# Patient Record
Sex: MALE | Race: WHITE | NOT HISPANIC OR LATINO | Employment: FULL TIME | URBAN - METROPOLITAN AREA
[De-identification: names, ages, dates, MRNs, and addresses within clinical notes are randomized per-mention and may not be internally consistent; named-entity substitution may affect disease eponyms.]

---

## 2023-11-22 ENCOUNTER — OFFICE VISIT (OUTPATIENT)
Dept: CARDIOLOGY | Facility: CLINIC | Age: 68
End: 2023-11-22
Payer: COMMERCIAL

## 2023-11-22 VITALS
DIASTOLIC BLOOD PRESSURE: 76 MMHG | WEIGHT: 236.4 LBS | RESPIRATION RATE: 16 BRPM | BODY MASS INDEX: 33.1 KG/M2 | HEART RATE: 56 BPM | SYSTOLIC BLOOD PRESSURE: 122 MMHG | HEIGHT: 71 IN

## 2023-11-22 DIAGNOSIS — R93.1 ELEVATED CORONARY ARTERY CALCIUM SCORE: Primary | ICD-10-CM

## 2023-11-22 DIAGNOSIS — E78.5 HYPERLIPIDEMIA LDL GOAL <100: ICD-10-CM

## 2023-11-22 DIAGNOSIS — I25.10 ATHEROSCLEROSIS OF NATIVE CORONARY ARTERY OF NATIVE HEART WITHOUT ANGINA PECTORIS: ICD-10-CM

## 2023-11-22 PROCEDURE — 99204 OFFICE O/P NEW MOD 45 MIN: CPT | Performed by: INTERNAL MEDICINE

## 2023-11-22 RX ORDER — ROSUVASTATIN CALCIUM 20 MG/1
20 TABLET, COATED ORAL DAILY
COMMUNITY

## 2023-11-22 RX ORDER — OMEGA-3 FATTY ACIDS/FISH OIL 300-500 MG
1 CAPSULE ORAL DAILY
COMMUNITY

## 2023-11-22 RX ORDER — MULTIVITAMIN,THERAPEUTIC
1 TABLET ORAL DAILY
COMMUNITY

## 2023-11-22 RX ORDER — ASPIRIN 81 MG/1
81 TABLET ORAL DAILY
Qty: 90 TABLET | Refills: 3 | Status: SHIPPED | OUTPATIENT
Start: 2023-11-22

## 2023-11-22 NOTE — PROGRESS NOTES
[facility]  [phone]          Assessment/Recommendations   Patient with family history of premature coronary artery disease with his father having myocardial infarction at age 35 and dying at age 52.  Patient also has hyperlipidemia and a recent calcium score which was 122, all of which was in the left anterior descending coronary artery.  He does not have any anginal symptoms and minimal shortness of breath with vigorous activity.  All of the calcium is in the left anterior descending and I think it would be prudent to have a baseline stress test.  The anterior wall is very easily visualized on echocardiography so I would recommend a stress echocardiogram for him.  Patient is agreeable.    Agree with aggressive approach to lipids and he has been recently started on rosuvastatin.  Would recommend that he repeat a lipid panel with his primary care physician 3 months after starting this medication with a goal of LDL less than 100.    We talked about exercise and I have strongly encouraged him to have aerobic exercise for a minimum of 30 minutes and a minimum of 5 times each week.  He will take that under advisement.    We talked about the Mediterranean diet and how it is low in carbohydrates which can reduce the risk of future cardiac events.    Because his calcium score is above 100, I would also recommend a baby aspirin each day which she will do.    We will call him for the stress echocardiogram at an of course call with the results and any further recommendations.    Thank you for allowing us to precipitate in his care.       History of Present Illness/Subjective    Mr. Stephen Trejo is a 68 year old male with known family history of premature coronary artery disease as noted above.  The patient does not have hypertension, does not smoke, but does have prediabetes.    He denies orthopnea, paroxysmal nocturnal dyspnea, peripheral edema, syncope, near syncope and has no history of  "rheumatic fever, heart murmur, cerebrovascular accident or TIA.    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  He does not exercise on a routine basis at the gym or go for walks.  He had a recent calcium score which was 122 all of which was in the left anterior descending coronary artery.  His LDL cholesterol was also elevated at 153 and is hemoglobin A1c was 6.4.    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  He is  has 2 daughters neither of whom have heart problems.    ECG: Personally reviewed.        Physical Examination Review of Systems   /76 (BP Location: Left arm, Patient Position: Sitting, Cuff Size: Adult Regular)   Pulse 56   Resp 16   Ht 1.803 m (5' 11\")   Wt 107.2 kg (236 lb 6.4 oz)   BMI 32.97 kg/m    Body mass index is 32.97 kg/m .  Wt Readings from Last 3 Encounters:   11/22/23 107.2 kg (236 lb 6.4 oz)     General Appearance:   Alert, cooperative and in no acute distress.   ENT/Mouth: Pink/moist oral mucosa   EYES:  no scleral icterus, normal conjunctivae   Neck: JVP normal. No Hepatojugular reflux. Thyroid not visualized.   Chest/Lungs:   Lungs are clear to auscultation, equal chest wall expansion.   Cardiovascular:   S1, S2 without murmur ,clicks or rubs. Brachial, radial and posterior tibial pulses are intact and symetric. No carotid bruits noted   Abdomen:  Nontender. BS+.    Extremities: No cyanosis, clubbing or edema   Skin: no xanthelasma, warm.    Neurologic: normal arm movement bilateral, no tremors     Psychiatric: Appropriate affect.      Enc Vitals  BP: 122/76  Pulse: 56  Resp: 16  Weight: 107.2 kg (236 lb 6.4 oz)  Height: 180.3 cm (5' 11\")                                           Medical History  Surgical History Family History Social History   No past medical history on file. No past surgical history on file. No family history on file. Social History     Socioeconomic History    Marital status:      " "Spouse name: Not on file    Number of children: Not on file    Years of education: Not on file    Highest education level: Not on file   Occupational History    Not on file   Tobacco Use    Smoking status: Never    Smokeless tobacco: Never   Vaping Use    Vaping Use: Never used   Substance and Sexual Activity    Alcohol use: Not on file    Drug use: Never    Sexual activity: Not on file   Other Topics Concern    Not on file   Social History Narrative    Not on file     Social Determinants of Health     Financial Resource Strain: Not on file   Food Insecurity: Not on file   Transportation Needs: Not on file   Physical Activity: Not on file   Stress: Not on file   Social Connections: Not on file   Interpersonal Safety: Not on file   Housing Stability: Not on file          Medications  Allergies   Current Outpatient Medications   Medication Sig Dispense Refill    aspirin 81 MG EC tablet Take 1 tablet (81 mg) by mouth daily 90 tablet 3    multivitamin, therapeutic (THERA-VIT) TABS tablet Take 1 tablet by mouth daily      Niacin (VITAMIN B-3 OR) Take 1 tablet by mouth daily      Omega-3 Fatty Acids (RA FISH OIL) 1000 MG CAPS Take 1 capsule by mouth daily      omeprazole (PRILOSEC) 20 MG DR capsule Take 20 mg by mouth daily      rosuvastatin (CRESTOR) 20 MG tablet Take 20 mg by mouth daily      No Known Allergies      Lab Results    Chemistry/lipid CBC Cardiac Enzymes/BNP/TSH/INR   No results found for: \"CHOL\", \"HDL\", \"TRIG\", \"CHOLHDL\", \"CREATININE\", \"BUN\", \"NA\", \"CO2\" No results found for: \"WBC\", \"HGB\", \"HCT\", \"MCV\", \"PLT\" No results found for: \"CKTOTAL\", \"CKMB\", \"TROPONINI\", \"BNP\", \"TSH\", \"INR\"                                         "

## 2023-12-06 ENCOUNTER — HOSPITAL ENCOUNTER (OUTPATIENT)
Dept: CARDIOLOGY | Facility: CLINIC | Age: 68
Discharge: HOME | End: 2023-12-06
Attending: INTERNAL MEDICINE | Admitting: INTERNAL MEDICINE
Payer: COMMERCIAL

## 2023-12-06 DIAGNOSIS — R93.1 ELEVATED CORONARY ARTERY CALCIUM SCORE: ICD-10-CM

## 2023-12-06 DIAGNOSIS — E78.5 HYPERLIPIDEMIA LDL GOAL <100: ICD-10-CM

## 2023-12-06 DIAGNOSIS — I25.10 ATHEROSCLEROSIS OF NATIVE CORONARY ARTERY OF NATIVE HEART WITHOUT ANGINA PECTORIS: ICD-10-CM

## 2023-12-06 PROCEDURE — 93350 STRESS TTE ONLY: CPT | Mod: TC

## 2023-12-06 PROCEDURE — 93350 STRESS TTE ONLY: CPT | Mod: 26 | Performed by: GENERAL ACUTE CARE HOSPITAL

## 2023-12-06 PROCEDURE — 93018 CV STRESS TEST I&R ONLY: CPT | Performed by: GENERAL ACUTE CARE HOSPITAL

## 2023-12-06 PROCEDURE — 93016 CV STRESS TEST SUPVJ ONLY: CPT | Performed by: INTERNAL MEDICINE

## 2023-12-06 PROCEDURE — 93325 DOPPLER ECHO COLOR FLOW MAPG: CPT | Mod: 26 | Performed by: GENERAL ACUTE CARE HOSPITAL

## 2023-12-06 PROCEDURE — 93325 DOPPLER ECHO COLOR FLOW MAPG: CPT | Mod: TC

## 2023-12-06 PROCEDURE — 93321 DOPPLER ECHO F-UP/LMTD STD: CPT | Mod: 26 | Performed by: GENERAL ACUTE CARE HOSPITAL

## 2024-11-15 ENCOUNTER — TRANSFERRED RECORDS (OUTPATIENT)
Dept: HEALTH INFORMATION MANAGEMENT | Facility: CLINIC | Age: 69
End: 2024-11-15

## 2024-11-15 LAB
ALT SERPL-CCNC: 46 U/L
AST SERPL-CCNC: 49 U/L (ref 17–59)
CHOLESTEROL (EXTERNAL): 116 MG/DL (ref 0–200)
CREATININE (EXTERNAL): 1 MG/DL (ref 0.7–1.4)
GLUCOSE (EXTERNAL): 94 MG/DL (ref 60–99)
HBA1C MFR BLD: 6.09 % (ref 4–5.6)
HDLC SERPL-MCNC: 44 MG/DL (ref 35–65)
LDL CHOLESTEROL CALCULATED (EXTERNAL): 53 MG/DL (ref 0–130)
POTASSIUM (EXTERNAL): 4.2 MMOL/L (ref 3.5–5.1)
TRIGLYCERIDES (EXTERNAL): 95 MG/DL (ref 0–200)

## 2025-04-16 ENCOUNTER — OFFICE VISIT (OUTPATIENT)
Dept: CARDIOLOGY | Facility: CLINIC | Age: 70
End: 2025-04-16
Attending: INTERNAL MEDICINE
Payer: COMMERCIAL

## 2025-04-16 VITALS
HEART RATE: 56 BPM | RESPIRATION RATE: 14 BRPM | SYSTOLIC BLOOD PRESSURE: 137 MMHG | WEIGHT: 238 LBS | DIASTOLIC BLOOD PRESSURE: 89 MMHG | BODY MASS INDEX: 33.19 KG/M2

## 2025-04-16 DIAGNOSIS — R93.1 ELEVATED CORONARY ARTERY CALCIUM SCORE: ICD-10-CM

## 2025-04-16 DIAGNOSIS — I25.10 ATHEROSCLEROSIS OF NATIVE CORONARY ARTERY OF NATIVE HEART WITHOUT ANGINA PECTORIS: ICD-10-CM

## 2025-04-16 DIAGNOSIS — E78.5 HYPERLIPIDEMIA LDL GOAL <100: ICD-10-CM

## 2025-04-16 PROCEDURE — 3079F DIAST BP 80-89 MM HG: CPT | Performed by: INTERNAL MEDICINE

## 2025-04-16 PROCEDURE — 99213 OFFICE O/P EST LOW 20 MIN: CPT | Performed by: INTERNAL MEDICINE

## 2025-04-16 PROCEDURE — 3075F SYST BP GE 130 - 139MM HG: CPT | Performed by: INTERNAL MEDICINE

## 2025-04-16 NOTE — LETTER
4/16/2025    Darrell Saeed MD  [facility] [id number]    RE: Stephen Trejo       Dear Colleague,     I had the pleasure of seeing Stephen Trejo in the [facility].      [facility]  [phone]          Assessment/Recommendations   Patient with known history of premature coronary artery disease in his family, hyperlipidemia and calcium score of 122.  Stress test was unremarkable and most of the calcium was at the left anterior descending so stress echocardiogram would be an excellent way to  ischemia in the setting of an LAD stenosis.  His LDL cholesterols come down to 53 on current medication, he takes a baby aspirin a day and has no anginal symptoms.  He is active playing hockey in the wintertime and does golf although rides in a car in the summer.  I have encouraged him to do 30 minutes of brisk walking or like exercise at least 5 times a week going forward he will take that under advisement.    I am not changing his medications today.  Blood pressure is at goal and he takes an antiplatelet agent.    Will have him come back in 1 year, but of course to be happy to see him sooner if questions or problems arise.    It has been my honor to take care of Stephen Trejo       History of Present Illness/Subjective    Mr. Stephen Trejo is a 69 year old male with known elevation in calcium score, family history of coronary artery disease and hyperlipidemia.  He has been doing well this past year.  No chest discomfort, orthopnea, unusual shortness of breath with activity.  No palpitations, syncope or near syncopal episodes.  LDL cholesterol was measured at 53.  Stress echocardiogram was normal last year.           Physical Examination Review of Systems   /89 (BP Location: Right arm, Patient Position: Sitting, Cuff Size: Adult Large)   Pulse 56   Resp 14   Wt 108 kg (238 lb)   BMI 33.19 kg/m    Body mass index  is 33.19 kg/m .  Wt Readings from Last 3 Encounters:   04/16/25 108 kg (238 lb)   11/22/23 107.2 kg (236 lb 6.4 oz)     General Appearance:   Alert, cooperative and in no acute distress.   ENT/Mouth: Pink/moist oral mucosa   EYES:  no scleral icterus, normal conjunctivae   Neck: JVP normal. No Hepatojugular reflux. Thyroid not visualized.   Chest/Lungs:   Lungs are clear to auscultation, equal chest wall expansion.   Cardiovascular:   S1, S2 without murmur ,clicks or rubs. Brachial, radial and posterior tibial pulses are intact and symetric. No carotid bruits noted   Abdomen:  Nontender. BS+.    Extremities: No cyanosis, clubbing or edema   Skin: no xanthelasma, warm.    Neurologic: normal arm movement bilateral, no tremors     Psychiatric: Appropriate affect.      Encounter Vitals  BP: 137/89  Pulse: 56  Resp: 14  Weight: 108 kg (238 lb)                                           Medical History  Surgical History Family History Social History   No past medical history on file. No past surgical history on file. No family history on file. Social History     Socioeconomic History     Marital status:      Spouse name: Not on file     Number of children: Not on file     Years of education: Not on file     Highest education level: Not on file   Occupational History     Not on file   Tobacco Use     Smoking status: Never     Smokeless tobacco: Never   Vaping Use     Vaping status: Never Used   Substance and Sexual Activity     Alcohol use: Not on file     Drug use: Never     Sexual activity: Not on file   Other Topics Concern     Not on file   Social History Narrative     Not on file     Social Drivers of Health     Financial Resource Strain: Not on file   Food Insecurity: Not on file   Transportation Needs: Not on file   Physical Activity: Not on file   Stress: Not on file   Social Connections: Not on file   Interpersonal Safety: Not on file   Housing Stability: Not on file          Medications  Allergies   Current  "Outpatient Medications   Medication Sig Dispense Refill     ASPIRIN LOW DOSE 81 MG EC tablet TAKE 1 TABLET BY MOUTH EVERY DAY 90 tablet 0     multivitamin, therapeutic (THERA-VIT) TABS tablet Take 1 tablet by mouth daily       Niacin (VITAMIN B-3 OR) Take 1 tablet by mouth daily       Omega-3 Fatty Acids (RA FISH OIL) 1000 MG CAPS Take 1 capsule by mouth daily       omeprazole (PRILOSEC) 20 MG DR capsule Take 20 mg by mouth daily       rosuvastatin (CRESTOR) 20 MG tablet Take 20 mg by mouth daily      No Known Allergies      Lab Results    Chemistry/lipid CBC Cardiac Enzymes/BNP/TSH/INR   Lab Results   Component Value Date    TRIG 95 11/15/2024    No results found for: \"WBC\", \"HGB\", \"HCT\", \"MCV\", \"PLT\" No results found for: \"CKTOTAL\", \"CKMB\", \"TROPONINI\", \"BNP\", \"TSH\", \"INR\"                                             Thank you for allowing me to participate in the care of your patient.      Sincerely,     Allan Hooper MD     [facility]  cc:   Allan Hooper MD  [address] [facility] [place] [address]      "

## 2025-04-16 NOTE — PROGRESS NOTES
[facility]  [phone]          Assessment/Recommendations   Patient with known history of premature coronary artery disease in his family, hyperlipidemia and calcium score of 122.  Stress test was unremarkable and most of the calcium was at the left anterior descending so stress echocardiogram would be an excellent way to  ischemia in the setting of an LAD stenosis.  His LDL cholesterols come down to 53 on current medication, he takes a baby aspirin a day and has no anginal symptoms.  He is active playing hockey in the wintertime and does golf although rides in a car in the summer.  I have encouraged him to do 30 minutes of brisk walking or like exercise at least 5 times a week going forward he will take that under advisement.    I am not changing his medications today.  Blood pressure is at goal and he takes an antiplatelet agent.    Will have him come back in 1 year, but of course to be happy to see him sooner if questions or problems arise.    It has been my honor to take care of Stephen Trejo       History of Present Illness/Subjective    Mr. Stephen Trejo is a 69 year old male with known elevation in calcium score, family history of coronary artery disease and hyperlipidemia.  He has been doing well this past year.  No chest discomfort, orthopnea, unusual shortness of breath with activity.  No palpitations, syncope or near syncopal episodes.  LDL cholesterol was measured at 53.  Stress echocardiogram was normal last year.           Physical Examination Review of Systems   /89 (BP Location: Right arm, Patient Position: Sitting, Cuff Size: Adult Large)   Pulse 56   Resp 14   Wt 108 kg (238 lb)   BMI 33.19 kg/m    Body mass index is 33.19 kg/m .  Wt Readings from Last 3 Encounters:   04/16/25 108 kg (238 lb)   11/22/23 107.2 kg (236 lb 6.4 oz)     General Appearance:   Alert, cooperative and in no acute distress.   ENT/Mouth: Pink/moist oral mucosa   EYES:  no  scleral icterus, normal conjunctivae   Neck: JVP normal. No Hepatojugular reflux. Thyroid not visualized.   Chest/Lungs:   Lungs are clear to auscultation, equal chest wall expansion.   Cardiovascular:   S1, S2 without murmur ,clicks or rubs. Brachial, radial and posterior tibial pulses are intact and symetric. No carotid bruits noted   Abdomen:  Nontender. BS+.    Extremities: No cyanosis, clubbing or edema   Skin: no xanthelasma, warm.    Neurologic: normal arm movement bilateral, no tremors     Psychiatric: Appropriate affect.      Encounter Vitals  BP: 137/89  Pulse: 56  Resp: 14  Weight: 108 kg (238 lb)                                           Medical History  Surgical History Family History Social History   No past medical history on file. No past surgical history on file. No family history on file. Social History     Socioeconomic History    Marital status:      Spouse name: Not on file    Number of children: Not on file    Years of education: Not on file    Highest education level: Not on file   Occupational History    Not on file   Tobacco Use    Smoking status: Never    Smokeless tobacco: Never   Vaping Use    Vaping status: Never Used   Substance and Sexual Activity    Alcohol use: Not on file    Drug use: Never    Sexual activity: Not on file   Other Topics Concern    Not on file   Social History Narrative    Not on file     Social Drivers of Health     Financial Resource Strain: Not on file   Food Insecurity: Not on file   Transportation Needs: Not on file   Physical Activity: Not on file   Stress: Not on file   Social Connections: Not on file   Interpersonal Safety: Not on file   Housing Stability: Not on file          Medications  Allergies   Current Outpatient Medications   Medication Sig Dispense Refill    ASPIRIN LOW DOSE 81 MG EC tablet TAKE 1 TABLET BY MOUTH EVERY DAY 90 tablet 0    multivitamin, therapeutic (THERA-VIT) TABS tablet Take 1 tablet by mouth daily      Niacin (VITAMIN B-3 OR)  "Take 1 tablet by mouth daily      Omega-3 Fatty Acids (RA FISH OIL) 1000 MG CAPS Take 1 capsule by mouth daily      omeprazole (PRILOSEC) 20 MG DR capsule Take 20 mg by mouth daily      rosuvastatin (CRESTOR) 20 MG tablet Take 20 mg by mouth daily      No Known Allergies      Lab Results    Chemistry/lipid CBC Cardiac Enzymes/BNP/TSH/INR   Lab Results   Component Value Date    TRIG 95 11/15/2024    No results found for: \"WBC\", \"HGB\", \"HCT\", \"MCV\", \"PLT\" No results found for: \"CKTOTAL\", \"CKMB\", \"TROPONINI\", \"BNP\", \"TSH\", \"INR\"                                         "

## 2025-05-18 DIAGNOSIS — I25.10 ATHEROSCLEROSIS OF NATIVE CORONARY ARTERY OF NATIVE HEART WITHOUT ANGINA PECTORIS: ICD-10-CM

## 2025-05-18 DIAGNOSIS — E78.5 HYPERLIPIDEMIA LDL GOAL <100: ICD-10-CM

## 2025-05-18 DIAGNOSIS — R93.1 ELEVATED CORONARY ARTERY CALCIUM SCORE: ICD-10-CM

## 2025-05-19 RX ORDER — ASPIRIN 81 MG/1
81 TABLET, COATED ORAL DAILY
Qty: 90 TABLET | Refills: 2 | Status: SHIPPED | OUTPATIENT
Start: 2025-05-19